# Patient Record
Sex: FEMALE | Race: BLACK OR AFRICAN AMERICAN | NOT HISPANIC OR LATINO | Employment: FULL TIME | URBAN - METROPOLITAN AREA
[De-identification: names, ages, dates, MRNs, and addresses within clinical notes are randomized per-mention and may not be internally consistent; named-entity substitution may affect disease eponyms.]

---

## 2021-04-26 ENCOUNTER — PATIENT MESSAGE (OUTPATIENT)
Dept: RESEARCH | Facility: HOSPITAL | Age: 26
End: 2021-04-26

## 2024-01-30 PROBLEM — R31.9 URINARY TRACT INFECTION WITH HEMATURIA: Status: ACTIVE | Noted: 2024-01-30

## 2024-01-30 PROBLEM — N39.0 URINARY TRACT INFECTION WITH HEMATURIA: Status: ACTIVE | Noted: 2024-01-30

## 2024-01-30 PROBLEM — R10.9 ABDOMINAL PAIN: Status: ACTIVE | Noted: 2024-01-30

## 2024-05-06 PROBLEM — R31.9 URINARY TRACT INFECTION WITH HEMATURIA: Status: RESOLVED | Noted: 2024-01-30 | Resolved: 2024-05-06

## 2024-05-06 PROBLEM — N39.0 URINARY TRACT INFECTION WITH HEMATURIA: Status: RESOLVED | Noted: 2024-01-30 | Resolved: 2024-05-06

## 2024-08-26 ENCOUNTER — TELEPHONE (OUTPATIENT)
Dept: PODIATRY | Facility: CLINIC | Age: 29
End: 2024-08-26
Payer: MEDICAID

## 2024-08-26 NOTE — TELEPHONE ENCOUNTER
Call can't be completed at this time.----- Message from La Nena Prieto sent at 8/26/2024  3:06 PM CDT -----  Contact: pt @ [phone]  Mary Kate Gonzalez calling regarding Appointment Access  (message) for #pt is calling to reschedule appt 8/26 due to pt went to wrong location, asking for call back

## 2024-09-17 ENCOUNTER — OFFICE VISIT (OUTPATIENT)
Dept: PODIATRY | Facility: CLINIC | Age: 29
End: 2024-09-17
Payer: MEDICAID

## 2024-09-17 VITALS
WEIGHT: 268.75 LBS | HEIGHT: 65 IN | HEART RATE: 77 BPM | SYSTOLIC BLOOD PRESSURE: 129 MMHG | DIASTOLIC BLOOD PRESSURE: 85 MMHG | BODY MASS INDEX: 44.78 KG/M2

## 2024-09-17 DIAGNOSIS — L60.8 DISCOLORATION OF NAIL: ICD-10-CM

## 2024-09-17 DIAGNOSIS — L29.9 ITCHY SKIN: Primary | ICD-10-CM

## 2024-09-17 DIAGNOSIS — B35.1 ONYCHOMYCOSIS OF RIGHT GREAT TOE: ICD-10-CM

## 2024-09-17 DIAGNOSIS — S90.221S CONTUSION OF LESSER TOE OF RIGHT FOOT WITH DAMAGE TO NAIL, SEQUELA: ICD-10-CM

## 2024-09-17 DIAGNOSIS — M79.671 PAIN OF BOTH HEELS: ICD-10-CM

## 2024-09-17 DIAGNOSIS — M72.2 PLANTAR FASCIITIS: ICD-10-CM

## 2024-09-17 DIAGNOSIS — S90.211S CONTUSION OF RIGHT GREAT TOE WITH DAMAGE TO NAIL, SEQUELA: ICD-10-CM

## 2024-09-17 DIAGNOSIS — M79.672 PAIN OF BOTH HEELS: ICD-10-CM

## 2024-09-17 DIAGNOSIS — M77.31 HEEL SPUR, RIGHT: ICD-10-CM

## 2024-09-17 DIAGNOSIS — B35.3 TINEA PEDIS OF BOTH FEET: ICD-10-CM

## 2024-09-17 PROCEDURE — 3079F DIAST BP 80-89 MM HG: CPT | Mod: CPTII,,, | Performed by: PODIATRIST

## 2024-09-17 PROCEDURE — 11720 DEBRIDE NAIL 1-5: CPT | Mod: PBBFAC,PN | Performed by: PODIATRIST

## 2024-09-17 PROCEDURE — 99204 OFFICE O/P NEW MOD 45 MIN: CPT | Mod: 25,S$PBB,, | Performed by: PODIATRIST

## 2024-09-17 PROCEDURE — 99999 PR PBB SHADOW E&M-EST. PATIENT-LVL III: CPT | Mod: PBBFAC,,, | Performed by: PODIATRIST

## 2024-09-17 PROCEDURE — 1159F MED LIST DOCD IN RCRD: CPT | Mod: CPTII,,, | Performed by: PODIATRIST

## 2024-09-17 PROCEDURE — 3008F BODY MASS INDEX DOCD: CPT | Mod: CPTII,,, | Performed by: PODIATRIST

## 2024-09-17 PROCEDURE — 3074F SYST BP LT 130 MM HG: CPT | Mod: CPTII,,, | Performed by: PODIATRIST

## 2024-09-17 PROCEDURE — 99213 OFFICE O/P EST LOW 20 MIN: CPT | Mod: PBBFAC,PN | Performed by: PODIATRIST

## 2024-09-17 RX ORDER — DICLOFENAC SODIUM 10 MG/G
2 GEL TOPICAL 4 TIMES DAILY
Qty: 350 G | Refills: 2 | Status: SHIPPED | OUTPATIENT
Start: 2024-09-17

## 2024-09-17 RX ORDER — GENTIAN VIOLET 1% 10 MG/ML
0.5 LIQUID TOPICAL DAILY PRN
Qty: 59 ML | Refills: 0 | Status: SHIPPED | OUTPATIENT
Start: 2024-09-17

## 2024-09-17 RX ORDER — TIRZEPATIDE 5 MG/.5ML
5 INJECTION, SOLUTION SUBCUTANEOUS
COMMUNITY
Start: 2024-08-08

## 2024-09-17 RX ORDER — AMLODIPINE BESYLATE 10 MG/1
1 TABLET ORAL DAILY
COMMUNITY
Start: 2023-11-01 | End: 2024-10-07

## 2024-09-17 RX ORDER — ALBUTEROL SULFATE 0.63 MG/3ML
0.63 SOLUTION RESPIRATORY (INHALATION) 3 TIMES DAILY PRN
COMMUNITY

## 2024-09-17 NOTE — PROGRESS NOTES
Subjective:      Patient ID: Mary Kate Gonzalez is a 29 y.o. female.    Chief Complaint: Foot Pain (Right and left), Nail Problem (Right great toenail fell off), and Tinea Pedis    Mary Kate is a 29 y.o. female who presents new to the clinic, initially referred to [facility] but had requested [facility] d/t proximity. She     c/o pain & itching between toes R>L, skin scaly as well. Pain level 2/10.   Has thick & discolored toenails R. Usually goes for pedicures monthly but last was in June. Has had IGTN R 1st & 3rd removed 2010 but noticed them cracking last summer & recently w/ a black spot.  Started w/ ssx during summer (April) wearing cleats for softball - pain proximal B/L, R>L heel especially 1st thing in am for several steps/ minutes; tender to touch. 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   Went to ED [facility] 8/26/24 - dx plantar fasciitis. Has had Rx NSAID & injection in arm (Toradol). Advised on soaks & stretch & circumduct foot exercised w/out relief.    PCP Center, [facility]  Going back to [facility] since accepts her insurance now    No past medical history on file.  Patient Active Problem List   Diagnosis    Abdominal pain      Objective:      Review of Systems   Constitutional: Negative for malaise/fatigue.   Cardiovascular:  Negative for leg swelling.   Skin:  Positive for color change, dry skin, itching, nail changes and rash. Negative for suspicious lesions.   Musculoskeletal:  Positive for myalgias. Negative for falls and joint pain.   Psychiatric/Behavioral:  The patient is not nervous/anxious.      Physical Exam  Vitals reviewed.   Constitutional:       General: She is not in acute distress.     Appearance: She is well-developed. She is morbidly obese.   Cardiovascular:      Pulses: Normal pulses.           Dorsalis pedis pulses are 2+ on the right side and 2+ on the left side.   Musculoskeletal:         General: Tenderness present. No swelling  or signs of injury.      Right foot: Deformity present.      Left foot: Deformity (pes planus B/L) present.   Feet:      Right foot:      Skin integrity: Dry skin present. No erythema or warmth.      Toenail Condition: Right toenails are ingrown. Fungal disease present.     Left foot:      Skin integrity: Dry skin present. No erythema or warmth.      Comments: Equinus B/L ankles w/ < 90 deg foot to leg noted w/ knees extended.      MS strength of extrinsics to foot & ankle B/L + 5/5 in DF/PF/Inv/Ev to resistance w/ no reproduction of pain in any direction.   TTP R>L medial & R central heel insertion of fascia into heel w/out radiation; no TTP along PTT B/L    Passive ROM of ankle & pedal joints is painless & w/out crepitation B/L.     Skin:     General: Skin is warm and moist.      Capillary Refill: Capillary refill takes less than 2 seconds.      Findings: Rash present. No bruising, erythema or lesion. Rash is scaling.      Comments: Toenails 1st & 3rd R are hypertrophic & irregular w/ thick dystrophic appearane; discolored subungual debris as well as dried blood proximal lateral hallux & superficially central 3rd toenail. Distal nail plate loose from nail bed w/ slight tenderness to pressure; no periungual skin abnormality noted.    Dry, patchy scaling intertriginously w/ 2nd & 3rd macerated proximally R. No erythema.      Neurological:      Mental Status: She is alert and oriented to person, place, and time.      Sensory: Sensation is intact. No sensory deficit.      Motor: Motor function is intact. No weakness or abnormal muscle tone.      Gait: Gait is intact. Gait normal.   Psychiatric:         Mood and Affect: Mood and affect normal.         Behavior: Behavior normal. Behavior is cooperative.         Assessment:      Encounter Diagnoses   Name Primary?    Onychomycosis of right great toe     Tinea pedis of both feet     Contusion of right great toe with damage to nail, sequela     Plantar fasciitis     Pain  of both heels     Heel spur, right     Itchy skin Yes    Discoloration of nail     Contusion of lesser toe of right foot with damage to nail, sequela        Problem List Items Addressed This Visit    None  Visit Diagnoses       Itchy skin    -  Primary    Onychomycosis of right great toe        Relevant Orders    Nail debridement R 1st & 3rd    Tinea pedis of both feet        Relevant Medications    gentian violet 1 % topical solution    Contusion of right great toe with damage to nail, sequela        Relevant Orders    Nail debridement R 1st & 3rd    Plantar fasciitis        Relevant Medications    diclofenac sodium (VOLTAREN) 1 % Gel    Pain of both heels        Heel spur, right        Discoloration of nail        Relevant Orders    Nail debridement R 1st & 3rd    Contusion of lesser toe of right foot with damage to nail, sequela        Relevant Orders    Nail debridement R 1st & 3rd          Plan:       Mary Kate was seen today for foot pain, nail problem and tinea pedis.    Diagnoses and all orders for this visit:    Itchy skin    Onychomycosis of right great toe  -     Nail debridement R 1st & 3rd    Tinea pedis of both feet  -     gentian violet 1 % topical solution; Apply 0.5 mLs topically daily as needed.    Contusion of right great toe with damage to nail, sequela  -     Nail debridement R 1st & 3rd    Plantar fasciitis  -     diclofenac sodium (VOLTAREN) 1 % Gel; Apply 2 g topically 4 (four) times daily.    Pain of both heels    Heel spur, right    Discoloration of nail  -     Nail debridement R 1st & 3rd    Contusion of lesser toe of right foot with damage to nail, sequela  -     Nail debridement R 1st & 3rd    I counseled the patient on her conditions, their implications &  medical mgmt.    - Shoe inspection. Patient instructed on proper foot hygeine. We discussed wearing proper shoe gear, daily foot inspections, never walking without protective shoe gear, never putting sharp instruments to feet, routine  podiatric nail visits every 2-3 months.      - W/ patient's permission, nails were aggressively reduced & debrided x R 1st & 3rd to their soft tissue attachment mechanically, removing all offending nail & debris. Patient relates relief following the procedure.  Patient was advised on appropriate self-debridement of nails w/ correct instrumentation to prevent recurrence of ssx.     Instructions provided on OTC topical antifungal Tx options for nails R as well as webspace.  Applied gentian violet to webspaces.  Patient may apply q.d. until resolution of SX after which to use q.week after bathing p.r.n.    I reviewed imaging, clinical history, & diagnosis as above & d/w the patient. I attempted to use layman's terms to educate the patient.     -Discussed general issues surrounding plantar fasciitis, along w/ the advantages & disadvantages of various tx strategies.  -Discussed shoe choice.   The importance of wearing shoes w/ adequate arch supports to alleviate abnormal pressure & improve stability of foot while walking.   Avoid flat shoes or barefoot walking as these will exacerbate or worsen symptoms.  .   -Discussed non-prescription inserts OR PPT arch pads added to current shoe gear/ inserts; also dispensed Silipos gel strap for use in other shoes, for MLA support .  -Discussed Rx/ OTC topical NSAID such as Diclofenac 1% gel up to qid prn &/ OR PO NSAID.  -Other options include PO Medrol dosepak prior to PO NSAID &/ OR injection of LA & steroid.    She will continue to monitor the areas daily, inspect her feet, wear protective shoe gear when ambulatory, moisturizer to maintain skin integrity & follow in this office in approximately 3-4 wks., sooner p.r.n.        A total of 38 mins.was spent on chart review, patient visit & documentation.

## 2024-09-17 NOTE — LETTER
September 17, 2024      [facility]  [address]  [place] [address]  Phone: [phone]  Fax: [phone]       Patient: Mary Kate Gonzalez   YOB: 1995  Date of Visit: 09/17/2024    To Whom It May Concern:    Mazin Gonzalez  was at [facility] on 09/17/2024. The patient may return to work/school on 9/17/24 with no restrictions. If you have any questions or concerns, or if I can be of further assistance, please do not hesitate to contact me.    Sincerely,    Lucy Warren DPM

## 2024-09-17 NOTE — PATIENT INSTRUCTIONS
Pick one & use for at least 3-6 months (for your fungal nails)/ 2-4 weeks (for your skin):    1. Listerine mouthwash (yellow/gold) - soak for 5-10minutes daily (may re-use solution up to a week)    2. Vicks Vaporub to nails daily after a bath/end of day/bedtime.    3. Lamisil (terbanafine) 1% antifungal cream daily to nails after shower.       PPT arch pads w/ adhesive - medium    Visco-gel universal metatarsal strap - small/ medium right & left

## 2024-09-23 NOTE — PROCEDURES
Nail debridement R 1st & 3rd    Date/Time: 9/17/2024 1:00 PM    Performed by: Lucy Warren DPM  Authorized by: Lucy Warren DPM    Consent Done?:  Yes (Verbal)    Nail Care Type:  Debride(Right 1st Toe and Right 3rd Toe)  Patient tolerance:  Patient tolerated the procedure well with no immediate complications

## 2025-04-16 ENCOUNTER — HOSPITAL ENCOUNTER (EMERGENCY)
Facility: HOSPITAL | Age: 30
Discharge: HOME | End: 2025-04-17
Attending: EMERGENCY MEDICINE
Payer: MEDICAID

## 2025-04-16 DIAGNOSIS — G43.909 MIGRAINE WITHOUT STATUS MIGRAINOSUS, NOT INTRACTABLE, UNSPECIFIED MIGRAINE TYPE: Primary | ICD-10-CM

## 2025-04-16 LAB
ABSOLUTE EOSINOPHIL (SMH): 0.33 K/UL
ABSOLUTE MONOCYTE (SMH): 1.02 K/UL (ref 0.3–1)
ABSOLUTE NEUTROPHIL COUNT (SMH): 9.5 K/UL (ref 1.8–7.7)
B-HCG UR QL: NEGATIVE
BASOPHILS # BLD AUTO: 0.04 K/UL
BASOPHILS NFR BLD AUTO: 0.3 %
CTP QC/QA: YES
ERYTHROCYTE [DISTWIDTH] IN BLOOD BY AUTOMATED COUNT: 13.9 % (ref 11.5–14.5)
HCT VFR BLD AUTO: 39.7 % (ref 37–48.5)
HGB BLD-MCNC: 12.6 GM/DL (ref 12–16)
IMM GRANULOCYTES # BLD AUTO: 0.08 K/UL (ref 0–0.04)
IMM GRANULOCYTES NFR BLD AUTO: 0.5 % (ref 0–0.5)
LYMPHOCYTES # BLD AUTO: 3.96 K/UL (ref 1–4.8)
MCH RBC QN AUTO: 26.8 PG (ref 27–31)
MCHC RBC AUTO-ENTMCNC: 31.7 G/DL (ref 32–36)
MCV RBC AUTO: 85 FL (ref 82–98)
NUCLEATED RBC (/100WBC) (SMH): 0 /100 WBC
PLATELET # BLD AUTO: 346 K/UL (ref 150–450)
PMV BLD AUTO: 9.2 FL (ref 9.2–12.9)
RBC # BLD AUTO: 4.7 M/UL (ref 4–5.4)
RELATIVE EOSINOPHIL (SMH): 2.2 % (ref 0–8)
RELATIVE LYMPHOCYTE (SMH): 26.6 % (ref 18–48)
RELATIVE MONOCYTE (SMH): 6.9 % (ref 4–15)
RELATIVE NEUTROPHIL (SMH): 63.5 % (ref 38–73)
WBC # BLD AUTO: 14.89 K/UL (ref 3.9–12.7)

## 2025-04-16 PROCEDURE — 86803 HEPATITIS C AB TEST: CPT | Performed by: EMERGENCY MEDICINE

## 2025-04-16 PROCEDURE — 99285 EMERGENCY DEPT VISIT HI MDM: CPT | Mod: 25

## 2025-04-16 PROCEDURE — 80053 COMPREHEN METABOLIC PANEL: CPT | Performed by: EMERGENCY MEDICINE

## 2025-04-16 PROCEDURE — 96374 THER/PROPH/DIAG INJ IV PUSH: CPT

## 2025-04-16 PROCEDURE — 63600175 PHARM REV CODE 636 W HCPCS: Performed by: EMERGENCY MEDICINE

## 2025-04-16 PROCEDURE — 96375 TX/PRO/DX INJ NEW DRUG ADDON: CPT

## 2025-04-16 PROCEDURE — 87389 HIV-1 AG W/HIV-1&-2 AB AG IA: CPT | Performed by: EMERGENCY MEDICINE

## 2025-04-16 PROCEDURE — 25000003 PHARM REV CODE 250: Performed by: EMERGENCY MEDICINE

## 2025-04-16 PROCEDURE — 81025 URINE PREGNANCY TEST: CPT | Performed by: EMERGENCY MEDICINE

## 2025-04-16 PROCEDURE — 85025 COMPLETE CBC W/AUTO DIFF WBC: CPT | Performed by: EMERGENCY MEDICINE

## 2025-04-16 RX ORDER — PROCHLORPERAZINE EDISYLATE 5 MG/ML
10 INJECTION INTRAMUSCULAR; INTRAVENOUS
Status: COMPLETED | OUTPATIENT
Start: 2025-04-16 | End: 2025-04-16

## 2025-04-16 RX ORDER — ERGOCALCIFEROL 1.25 MG/1
50000 CAPSULE ORAL
COMMUNITY

## 2025-04-16 RX ORDER — BUPROPION HYDROCHLORIDE 300 MG/1
1 TABLET ORAL DAILY
COMMUNITY
Start: 2025-01-10

## 2025-04-16 RX ORDER — AMLODIPINE BESYLATE 5 MG/1
10 TABLET ORAL ONCE
Status: COMPLETED | OUTPATIENT
Start: 2025-04-17 | End: 2025-04-16

## 2025-04-16 RX ORDER — DIPHENHYDRAMINE HYDROCHLORIDE 50 MG/ML
25 INJECTION, SOLUTION INTRAMUSCULAR; INTRAVENOUS
Status: COMPLETED | OUTPATIENT
Start: 2025-04-16 | End: 2025-04-16

## 2025-04-16 RX ADMIN — DIPHENHYDRAMINE HYDROCHLORIDE 25 MG: 50 INJECTION INTRAMUSCULAR; INTRAVENOUS at 11:04

## 2025-04-16 RX ADMIN — AMLODIPINE BESYLATE 10 MG: 5 TABLET ORAL at 11:04

## 2025-04-16 RX ADMIN — PROCHLORPERAZINE EDISYLATE 10 MG: 5 INJECTION INTRAMUSCULAR; INTRAVENOUS at 11:04

## 2025-04-17 VITALS
BODY MASS INDEX: 43.32 KG/M2 | DIASTOLIC BLOOD PRESSURE: 81 MMHG | HEIGHT: 65 IN | OXYGEN SATURATION: 96 % | SYSTOLIC BLOOD PRESSURE: 168 MMHG | RESPIRATION RATE: 18 BRPM | WEIGHT: 260 LBS | HEART RATE: 81 BPM | TEMPERATURE: 99 F

## 2025-04-17 LAB
ALBUMIN SERPL-MCNC: 4 G/DL (ref 3.5–5.2)
ALP SERPL-CCNC: 114 UNIT/L (ref 55–135)
ALT SERPL-CCNC: 8 UNIT/L (ref 10–44)
ANION GAP (SMH): 7 MMOL/L (ref 8–16)
AST SERPL-CCNC: 11 UNIT/L (ref 10–40)
BILIRUB SERPL-MCNC: 0.4 MG/DL (ref 0.1–1)
BUN SERPL-MCNC: 17 MG/DL (ref 6–20)
CALCIUM SERPL-MCNC: 9.6 MG/DL (ref 8.7–10.5)
CHLORIDE SERPL-SCNC: 104 MMOL/L (ref 95–110)
CO2 SERPL-SCNC: 25 MMOL/L (ref 23–29)
CREAT SERPL-MCNC: 1.1 MG/DL (ref 0.5–1.4)
GFR SERPLBLD CREATININE-BSD FMLA CKD-EPI: >60 ML/MIN/1.73/M2
GLUCOSE SERPL-MCNC: 113 MG/DL (ref 70–110)
HCV AB SERPL QL IA: NORMAL
HIV 1+2 AB+HIV1 P24 AG SERPL QL IA: NORMAL
POTASSIUM SERPL-SCNC: 3.8 MMOL/L (ref 3.5–5.1)
PROT SERPL-MCNC: 7.4 GM/DL (ref 6–8.4)
SODIUM SERPL-SCNC: 136 MMOL/L (ref 136–145)

## 2025-04-17 NOTE — DISCHARGE INSTRUCTIONS
Please follow-up with your family physician.  Return for any headache that is unrelenting associated with weakness difficulty with speech fever chills.

## 2025-04-17 NOTE — ED PROVIDER NOTES
"Encounter Date: 4/16/2025       History     Chief Complaint   Patient presents with    Migraine     R sided traveling down to shoulder      Chief complaint is headache she describes as an aching pain behind her right eye starting around 12 noon fairly constant since then.  She does have a history of headaches about once a week.  She is not sure but she thinks most of the time the headaches are on the left side and not the right side but she is not sure.  She only takes Excedrin and rests and it goes away.  She does have a history of hypertension noncompliant she has been out of blood pressure medicines for "a minute" she normally takes amlodipine.  No history of trauma no history of fever chills.        Review of patient's allergies indicates:   Allergen Reactions    Penicillins      Vomiting and nausea     No past medical history on file.  Past Surgical History:   Procedure Laterality Date    UMBILICAL HERNIA REPAIR       No family history on file.  Social History[1]  Review of Systems   Constitutional:  Negative for chills and fever.   HENT:  Negative for ear pain, rhinorrhea and sore throat.    Eyes:  Negative for pain and visual disturbance.   Respiratory:  Negative for cough and shortness of breath.    Cardiovascular:  Negative for chest pain and palpitations.   Gastrointestinal:  Negative for abdominal pain, constipation, diarrhea, nausea and vomiting.   Genitourinary:  Negative for dysuria, frequency, hematuria and urgency.   Musculoskeletal:  Negative for back pain, joint swelling and myalgias.   Skin:  Negative for rash.   Neurological:  Positive for headaches. Negative for dizziness, seizures and weakness.   Psychiatric/Behavioral:  Negative for dysphoric mood. The patient is not nervous/anxious.        Physical Exam     Initial Vitals [04/16/25 2231]   BP Pulse Resp Temp SpO2   (!) 159/101 73 18 99.1 °F (37.3 °C) 98 %      MAP       --         Physical Exam    Nursing note and vitals " reviewed.  Constitutional: She appears well-developed and well-nourished.   HENT:   Head: Normocephalic and atraumatic.   Eyes: Conjunctivae, EOM and lids are normal. Pupils are equal, round, and reactive to light.   Neck: Trachea normal. Neck supple. No thyroid mass and no thyromegaly present.   Normal range of motion.  Cardiovascular:  Normal rate, regular rhythm and normal heart sounds.           Pulmonary/Chest: Effort normal and breath sounds normal.   Abdominal: Abdomen is soft. Bowel sounds are normal. There is no abdominal tenderness.   Musculoskeletal:         General: Normal range of motion.      Cervical back: Normal range of motion and neck supple.     Neurological: She is alert and oriented to person, place, and time. She has normal strength and normal reflexes. No cranial nerve deficit or sensory deficit.   Skin: Skin is warm and dry.   Psychiatric: She has a normal mood and affect. Her speech is normal and behavior is normal. Judgment and thought content normal.         ED Course   Procedures  Labs Reviewed   COMPREHENSIVE METABOLIC PANEL - Abnormal       Result Value    Sodium 136      Potassium 3.8      Chloride 104      CO2 25      Glucose 113 (*)     BUN 17      Creatinine 1.1      Calcium 9.6      Protein Total 7.4      Albumin 4.0      Bilirubin Total 0.4            AST 11      ALT 8 (*)     Anion Gap 7 (*)     eGFR >60     CBC WITH DIFFERENTIAL - Abnormal    WBC 14.89 (*)     RBC 4.70      Hgb 12.6      Hct 39.7      MCV 85      MCH 26.8 (*)     MCHC 31.7 (*)     RDW 13.9      Platelet Count 346      MPV 9.2      Nucleated RBC 0      Neut % 63.5      Lymph % 26.6      Mono % 6.9      Eos % 2.2      Basophil % 0.3      Imm Grans % 0.5      Neut # 9.5 (*)     Lymph # 3.96      Mono # 1.02 (*)     Eos # 0.33      Baso # 0.04      Imm Grans # 0.08 (*)    CBC W/ AUTO DIFFERENTIAL    Narrative:     The following orders were created for panel order CBC auto differential.  Procedure                                Abnormality         Status                     ---------                               -----------         ------                     CBC with Differential[[id number]]       Abnormal            Final result                 Please view results for these tests on the individual orders.   HEPATITIS C ANTIBODY   HIV 1 / 2 ANTIBODY   POCT URINE PREGNANCY    POC Preg Test, Ur Negative       Acceptable Yes            Imaging Results              CT Head Without Contrast (Final result)  Result time 04/17/25 01:32:41      Final result by Brian Duran MD (04/17/25 01:32:41)                   Impression:      No acute intracranial abnormalities.    Mild enlargement of the lateral ventricles with normal size 3rd and 4th ventricle, nonspecific.  No additional findings to suggest hydrocephalus at this time.      Electronically signed by: Brian Duran MD  Date:    04/17/2025  Time:    01:32               Narrative:    EXAMINATION:  CT HEAD WITHOUT CONTRAST    CLINICAL HISTORY:  headache;    TECHNIQUE:  Low dose axial images were obtained through the head.  Coronal and sagittal reformations were also performed. Contrast was not administered.    COMPARISON:  None.    FINDINGS:  The brain parenchyma appears normal for age with good corticomedullary differentiation.  There is no evidence of acute infarct, hemorrhage, or mass.  Mild enlargement of the lateral ventricles with normal sized 3rd and 4th ventricle.  No distension of the temporal horns, rounding of the frontal horns or transependymal edema to suggest hydrocephalus..  No mass-effect or midline shift.  There are no abnormal extra-axial fluid collections.  The paranasal sinuses and mastoid air cells are clear.  The calvarium appears intact.  .                                       Medications   prochlorperazine injection Soln 10 mg (10 mg Intravenous Given 4/16/25 8851)   diphenhydrAMINE injection 25 mg (25 mg Intravenous Given 4/16/25  9302)   amLODIPine tablet 10 mg (10 mg Oral Given 4/16/25 2334)     Medical Decision Making  After receiving medicines headache has resolved.  CT negative.  I do not feel this is a headache compatible with subarachnoid hemorrhage.  It came on gradual not the worst headache of her life. Rickie Lubin MD  1:52 AM 04/17/2025          Amount and/or Complexity of Data Reviewed  Labs: ordered.  Radiology: ordered.    Risk  Prescription drug management.                                      Clinical Impression:  Final diagnoses:  [G43.909] Migraine without status migrainosus, not intractable, unspecified migraine type (Primary)          ED Disposition Condition    Discharge Good          ED Prescriptions    None       Follow-up Information    None              [1]   Social History  Tobacco Use    Smoking status: Never        Rickie Lubin MD  04/17/25 2500